# Patient Record
Sex: FEMALE | HISPANIC OR LATINO | URBAN - METROPOLITAN AREA
[De-identification: names, ages, dates, MRNs, and addresses within clinical notes are randomized per-mention and may not be internally consistent; named-entity substitution may affect disease eponyms.]

---

## 2023-10-18 ENCOUNTER — OFFICE VISIT (OUTPATIENT)
Dept: URBAN - METROPOLITAN AREA CLINIC 98 | Facility: CLINIC | Age: 38
End: 2023-10-18
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
## 2023-11-09 ENCOUNTER — OFFICE VISIT (OUTPATIENT)
Dept: URBAN - METROPOLITAN AREA CLINIC 98 | Facility: CLINIC | Age: 38
End: 2023-11-09
Payer: OTHER MISCELLANEOUS

## 2023-11-09 DIAGNOSIS — S36.113S LACERATION OF LIVER, SEQUELA: ICD-10-CM

## 2023-11-09 PROCEDURE — 99204 OFFICE O/P NEW MOD 45 MIN: CPT | Performed by: INTERNAL MEDICINE
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
## 2023-11-14 ENCOUNTER — TELEPHONE ENCOUNTER (OUTPATIENT)
Dept: URBAN - METROPOLITAN AREA CLINIC 23 | Facility: CLINIC | Age: 38
End: 2023-11-14

## 2023-11-22 ENCOUNTER — OFFICE VISIT (OUTPATIENT)
Dept: URBAN - METROPOLITAN AREA CLINIC 12 | Facility: CLINIC | Age: 38
End: 2023-11-22

## 2023-11-29 ENCOUNTER — TELEPHONE ENCOUNTER (OUTPATIENT)
Dept: URBAN - METROPOLITAN AREA CLINIC 63 | Facility: CLINIC | Age: 38
End: 2023-11-29

## 2023-11-30 ENCOUNTER — OFFICE VISIT (OUTPATIENT)
Dept: URBAN - METROPOLITAN AREA CLINIC 98 | Facility: CLINIC | Age: 38
End: 2023-11-30
Payer: OTHER MISCELLANEOUS

## 2023-11-30 ENCOUNTER — DASHBOARD ENCOUNTERS (OUTPATIENT)
Age: 38
End: 2023-11-30

## 2023-11-30 ENCOUNTER — LAB OUTSIDE AN ENCOUNTER (OUTPATIENT)
Dept: URBAN - METROPOLITAN AREA CLINIC 98 | Facility: CLINIC | Age: 38
End: 2023-11-30

## 2023-11-30 VITALS
WEIGHT: 101.8 LBS | DIASTOLIC BLOOD PRESSURE: 91 MMHG | SYSTOLIC BLOOD PRESSURE: 126 MMHG | HEIGHT: 62 IN | BODY MASS INDEX: 18.74 KG/M2 | HEART RATE: 100 BPM | TEMPERATURE: 98.1 F

## 2023-11-30 DIAGNOSIS — R10.84 GENERALIZED ABDOMINAL PAIN: ICD-10-CM

## 2023-11-30 DIAGNOSIS — S36.113D LACERATION OF LIVER, SUBSEQUENT ENCOUNTER: ICD-10-CM

## 2023-11-30 PROCEDURE — 99214 OFFICE O/P EST MOD 30 MIN: CPT | Performed by: INTERNAL MEDICINE

## 2023-11-30 RX ORDER — CYCLOBENZAPRINE HYDROCHLORIDE 10 MG/1
1 TABLET AT BEDTIME AS NEEDED TABLET, FILM COATED ORAL ONCE A DAY
Status: ACTIVE | COMMUNITY

## 2023-11-30 RX ORDER — TRAMADOL HYDROCHLORIDE 50 MG/1
1 TABLET AS NEEDED TABLET, FILM COATED ORAL ONCE A DAY
Status: ACTIVE | COMMUNITY

## 2023-11-30 NOTE — EXAM-FUNCTIONAL ASSESSMENT
Constitutional: well-developed, normal communication ability.   Eyes: Conjunctivae and eyelids appear normal, no scleral icterus. Respiratory: symmetric expansion of chest wall, normal work of breathing   Gastrointestinal:  normoactive bowel sounds, soft, diffuse generalized tenderness, no rebound tenderness, no shifting dullness, no organomegaly.   Musculoskeletal: slow gait   Skin: no jaundice   Neurologic: Oriented to person, place, time. Short term memory intact.    Psychiatric: Normal mood and appropriate affect.
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
## 2023-12-01 LAB
ALBUMIN: 4.5
ALKALINE PHOSPHATASE: 88
ALT (SGPT): 9
AST (SGOT): 17
BILIRUBIN, DIRECT: 0.24
BILIRUBIN, TOTAL: 1
BUN/CREATININE RATIO: 12
BUN: 7
CARBON DIOXIDE, TOTAL: 23
CHLORIDE: 103
CREATININE: 0.58
EGFR: 118
GLUCOSE: 92
HEMATOCRIT: 41
HEMOGLOBIN: 13.3
MCH: 28.9
MCHC: 32.4
MCV: 89
NRBC: (no result)
PLATELETS: 320
POTASSIUM: 4.3
PROTEIN, TOTAL: 7.2
RBC: 4.6
RDW: 13.9
SODIUM: 139
WBC: 5.9

## 2023-12-22 ENCOUNTER — LAB OUTSIDE AN ENCOUNTER (OUTPATIENT)
Dept: URBAN - METROPOLITAN AREA CLINIC 98 | Facility: CLINIC | Age: 38
End: 2023-12-22